# Patient Record
Sex: FEMALE | Race: BLACK OR AFRICAN AMERICAN
[De-identification: names, ages, dates, MRNs, and addresses within clinical notes are randomized per-mention and may not be internally consistent; named-entity substitution may affect disease eponyms.]

---

## 2019-04-18 ENCOUNTER — HOSPITAL ENCOUNTER (OUTPATIENT)
Dept: HOSPITAL 61 - PCVCIMAG | Age: 44
Discharge: HOME | End: 2019-04-18
Attending: INTERNAL MEDICINE
Payer: COMMERCIAL

## 2019-04-18 DIAGNOSIS — N28.9: ICD-10-CM

## 2019-04-18 DIAGNOSIS — Z01.818: Primary | ICD-10-CM

## 2019-04-18 DIAGNOSIS — R07.9: ICD-10-CM

## 2019-04-18 PROCEDURE — 93325 DOPPLER ECHO COLOR FLOW MAPG: CPT

## 2019-04-18 PROCEDURE — 93351 STRESS TTE COMPLETE: CPT

## 2019-04-18 NOTE — PCVCIMAG
--------------- APPROVED REPORT --------------





Study performed:  04/18/2019 15:55:53



Exam:  Stress Echocardiogram

Indication: pre op clearance, Chest pain, Renal diease

Patient Location: Echo lab

Stress Nurse: Samantha Terrell RN

Status: routine



Ht: 5 ft 4 in  

HR: 88 bpm      BP: 120/70 mmHg

Rhythm: NSR



Medical History

Medical History: Lupus, Rheumatoid arthiritis, breast 

cancer



Procedure

The patient underwent an Exercise Stress Test using the Chas 

Protocol. Blood pressure, heart rate, and EKG were monitored.

An Echocardiogram was performed by technician in four stages in quad 

fashion.  At peak stress, four selected images were obtained and 

placed side by side with resting images for comparison.



Stress Test Details

Stress Test:  Exercise stress testing was performed using a Chas 

protocol.

HR

Resting HR:            88 bpmMax Heart Rate (APMHR): 175 bpm 

Max HR Achieved:  169 bpmTarget HR (85% APMHR): 148 bpm

% of APMHR:         96

Recovery HR:            110 bpm

HR response to stress: Accelerated HR response to stress



BP

Resting BP:  120/70 mmHg

Max BP:       152/70 mmHg

Recovery BP:       124/78 mmHg

BP response to stress: Normal blood pressure response to 

stress.

ECG

Resting ECG:  Sinus Rhythm

Stress ECG:     Sinus Tachycardia

Arrhythmia:    VPC's

Recovery ECG: Sinus Rhythm



Clinical

Reason for Termination: Maximal effort

Exercise duration: 6 min sec

Highest Stage Achieved: Stage 2: 2.5 mph at 12% grade. 

Exercise capacity: 7.00 METs

Overall Exercise Capacity for Age: Poor



Pre-Stress Echo

The resting Echocardiogram showed normal left ventricular 

contractility with an estimated Ejection Fraction of about 55-60%. 

Normal wall motion in all segments on baseline images.



Post-Stress Echo

The stress Echocardiogram showed normal left ventricular 

contractility with an estimated Ejection Fraction of about 60-65%. 

Normal augmentation of wall motion in all segments on post stress 

images.



Clinical

No clinical or ECG evidence for ischemia.



Conclusion

Clinical Response:  Non-ischemic

Exercise Capacity:  Below Average

Stress ECG Response:  Non-ischemic

Stress Echo Images:  Ischemic

The left ventricle is normal in size and wall thickness in both the 

rest and stress images.



Other Information

Study Quality: Good



<Conclusion>

The left ventricle is normal in size and wall thickness in both the 

rest and stress images.